# Patient Record
Sex: MALE | Race: WHITE | NOT HISPANIC OR LATINO | URBAN - METROPOLITAN AREA
[De-identification: names, ages, dates, MRNs, and addresses within clinical notes are randomized per-mention and may not be internally consistent; named-entity substitution may affect disease eponyms.]

---

## 2020-02-08 ENCOUNTER — EMERGENCY (EMERGENCY)
Facility: HOSPITAL | Age: 40
LOS: 1 days | Discharge: HOME | End: 2020-02-08
Attending: STUDENT IN AN ORGANIZED HEALTH CARE EDUCATION/TRAINING PROGRAM | Admitting: STUDENT IN AN ORGANIZED HEALTH CARE EDUCATION/TRAINING PROGRAM
Payer: COMMERCIAL

## 2020-02-08 VITALS
OXYGEN SATURATION: 100 % | HEIGHT: 74 IN | WEIGHT: 207.01 LBS | RESPIRATION RATE: 16 BRPM | HEART RATE: 96 BPM | DIASTOLIC BLOOD PRESSURE: 112 MMHG | SYSTOLIC BLOOD PRESSURE: 160 MMHG | TEMPERATURE: 98 F

## 2020-02-08 VITALS
HEART RATE: 73 BPM | SYSTOLIC BLOOD PRESSURE: 155 MMHG | DIASTOLIC BLOOD PRESSURE: 99 MMHG | TEMPERATURE: 98 F | OXYGEN SATURATION: 99 % | RESPIRATION RATE: 16 BRPM

## 2020-02-08 PROCEDURE — 99283 EMERGENCY DEPT VISIT LOW MDM: CPT | Mod: 25

## 2020-02-08 PROCEDURE — 99284 EMERGENCY DEPT VISIT MOD MDM: CPT

## 2020-02-08 RX ORDER — CARBAMIDE PEROXIDE 81.86 MG/ML
5 SOLUTION/ DROPS AURICULAR (OTIC)
Qty: 30 | Refills: 0
Start: 2020-02-08 | End: 2020-02-11

## 2020-02-08 RX ORDER — METOPROLOL TARTRATE 50 MG
1 TABLET ORAL
Qty: 30 | Refills: 0
Start: 2020-02-08 | End: 2020-03-08

## 2020-02-08 RX ORDER — METOPROLOL TARTRATE 50 MG
25 TABLET ORAL ONCE
Refills: 0 | Status: COMPLETED | OUTPATIENT
Start: 2020-02-08 | End: 2020-02-08

## 2020-02-08 RX ADMIN — Medication 1 TABLET(S): at 02:48

## 2020-02-08 RX ADMIN — Medication 25 MILLIGRAM(S): at 02:48

## 2020-02-08 NOTE — ED PROVIDER NOTE - CARE PROVIDER_API CALL
LUCINDA GASCA  Internal Medicine  Phone: [phone]  Fax: [phone]  Follow Up Time:     Ashok Trevizo)  Facial Plastic and Reconstructive Surgery; Otolaryngology  [address] [place] [address]  Phone: [phone]  Fax: [phone]  Follow Up Time:

## 2020-02-08 NOTE — ED ADULT NURSE NOTE - OBJECTIVE STATEMENT
40 yr old male walked into ED c/o ringing to right ear x3.5 weeks; states worse tonight because the ringing got louder and he couldn't sleep; states he went for physical on Tuesday and PMD mentioned his BP was high, instructed to log BP and has follow up appointment first week in March

## 2020-02-08 NOTE — ED PROVIDER NOTE - NSFOLLOWUPINSTRUCTIONS_ED_ALL_ED_FT
Please follow up with your primary care doctor and ENT.  Return to the ER for persistent ringing, hearing loss, fever, headache, ear pain, or any other concerns.

## 2020-02-08 NOTE — ED PROVIDER NOTE - ENMT, MLM
Airway patent, Left TM visualized and clear.  Right TM with cerumen impaction.  Nasal mucosa clear. Mouth with normal mucosa. Throat has no vesicles, no oropharyngeal exudates and uvula is midline.

## 2020-02-08 NOTE — ED PROVIDER NOTE - CLINICAL SUMMARY MEDICAL DECISION MAKING FREE TEXT BOX
40 year old male with tinnitus in right ear x 3.5 weeks, no hearing loss, pain, headache.  Examine, treat as needed, ENT f/u

## 2020-02-08 NOTE — ED PROVIDER NOTE - CARE PLAN
Principal Discharge DX:	Tinnitus of right ear  Secondary Diagnosis:	Impacted cerumen of right ear  Secondary Diagnosis:	HTN (hypertension), benign

## 2020-02-08 NOTE — ED PROVIDER NOTE - PATIENT PORTAL LINK FT
You can access the FollowMyHealth Patient Portal offered by [facility] by registering at the following website: [email or web address]. By joining [employer]’s FollowMyHealth portal, you will also be able to view your health information using other applications (apps) compatible with our system.

## 2020-02-08 NOTE — ED PROVIDER NOTE - OBJECTIVE STATEMENT
40 year old male with no significant PMH present with ringing in his right ear x 3.5 weeks. He reports dull ringing sensation that he notices when its quiet.  He had a URI last week that has now resolved.  He denies hearing loss, ear pain,  headache, fever.  No trauma to the ear or water immersion.  Patient saw his PMD 3 days ago for a routine physical but did not mention the tinnitus.  Tonight, the ringing worsened and he could not sleep.  In addition, patient states his PMD told him his BP was elevated in the office (was 180/110) and he was told to buy and BP home monitor.  He has been using it to record BPs and states they have been persistently elevated.  Denies headache, chest pain, blurry vision, SOB.  He was not started on medication with his PMD, told to f/u on 3/5.  He admits to sodium-heavy diet and smoking.  PMD Rg Reynolds

## 2021-05-14 NOTE — ED ADULT TRIAGE NOTE - CCCP TRG CHIEF CMPLNT
ear ringing or noise Introduction Text (Please End With A Colon): The following procedure has been deferred: Procedure To Be Performed At Next Visit: Biopsy by shave method Detail Level: Detailed
